# Patient Record
Sex: FEMALE | URBAN - METROPOLITAN AREA
[De-identification: names, ages, dates, MRNs, and addresses within clinical notes are randomized per-mention and may not be internally consistent; named-entity substitution may affect disease eponyms.]

---

## 2022-06-17 ENCOUNTER — TELEPHONE (OUTPATIENT)
Dept: FAMILY MEDICINE CLINIC | Facility: CLINIC | Age: 62
End: 2022-06-17

## 2022-11-11 ENCOUNTER — TELEPHONE (OUTPATIENT)
Dept: FAMILY MEDICINE CLINIC | Facility: CLINIC | Age: 62
End: 2022-11-11

## 2023-03-10 ENCOUNTER — TELEPHONE (OUTPATIENT)
Dept: FAMILY MEDICINE CLINIC | Facility: CLINIC | Age: 63
End: 2023-03-10

## 2023-03-10 NOTE — TELEPHONE ENCOUNTER
Pt is on the [facility] supervisit list under Dr. Isrrael Armendariz. Never been seen here.   Letter sent to pt

## 2023-05-15 ENCOUNTER — TELEPHONE (OUTPATIENT)
Dept: OBGYN CLINIC | Facility: CLINIC | Age: 63
End: 2023-05-15

## 2023-05-15 NOTE — TELEPHONE ENCOUNTER
Pt had Dr. Alia Christian sent over test results to Dr. Betsy Kahn.   Pt is asking if the pap results have been received, it was sent around 11am on 5/15    Pls advise

## 2023-05-16 NOTE — TELEPHONE ENCOUNTER
Incoming faxes reviewed, nothing received for pt. Confirmed fax number with patient. States she will call to have PAP/HPV results faxed over.

## 2023-05-19 ENCOUNTER — TELEPHONE (OUTPATIENT)
Dept: HEMATOLOGY/ONCOLOGY | Facility: HOSPITAL | Age: 63
End: 2023-05-19

## 2023-05-22 NOTE — TELEPHONE ENCOUNTER
Patient calling to inquire if fax has been received. Patient  mentioned last fax sent last week Wednesday. Please call patient with feedback.

## 2023-05-24 NOTE — TELEPHONE ENCOUNTER
Pt is asking that Dr. Susanne Quach office call Dr Helena Galloway office  Phone number [phone]. Pt is asking that Dr. Jacinto Hawkins call the Dr instead of pt and give Dr. Nuvia Taylor office Dr. Susanne Quach fax number and request pap smear test results. Pt stating Dr. Andersen Client has faxed several times and pt is asking if the results have been received. Pt would like a callback verifying receipt of results for the appt pt has on 6/8.

## 2023-05-24 NOTE — TELEPHONE ENCOUNTER
Pt is calling  Asking if you can request pap results she is getting no  Response from DR Dr Ottoniel Bowser office  Phone number [phone]

## 2023-05-24 NOTE — TELEPHONE ENCOUNTER
Informed pt we have not received records yet. Pt states she called Dr Emelia Ford office several times and she is not getting anywhere. Advised pt medical records can take time to process and advised to give it a few days. Pt became upset. States no one wants to help her. States she has cervical cancer and asking if she is seeing the appropriate physician for cervical cancer. Advised pt that nurse cannot say without looking at her record. States she has HPV. Again, advised pt nurse cannot say without seeing record because there are different strains of hpv and also different levels of risk. Advised that [facility] Leone [address] can see pt but if pt needs gyne/onc then she would be referred to a specialist. Pt states she thinks she made the appt with the wrong type of doctor. States her doctor, Dr Gurpreet Woodard is also OB/gyne. Pt states, just cancel my appt and disconnected the call. Attempted to call Dr Sofia Rho clinic to ask that they call pt and direct her plan of care.  Office automated recording states clinic closes at 3 pm.

## 2023-05-25 ENCOUNTER — TELEPHONE (OUTPATIENT)
Dept: HEMATOLOGY/ONCOLOGY | Facility: HOSPITAL | Age: 63
End: 2023-05-25

## 2023-05-25 NOTE — TELEPHONE ENCOUNTER
Received fax, Pap records from Dr Yeni Watson. Pap report is signed off with comment stating \"Patient aware referral to gyneoncology\". Informed pt we received Pap records with written message for pt to see gyneonc. Informed pt [facility] is ob/gyne and not gyneonc. Pt requesting to cancel her appt with [facility]. Pt would like to  record at [facility]. Provided pt with [facility] loc info. Record placed in an envelope and  for pt p/u.

## 2023-05-25 NOTE — TELEPHONE ENCOUNTER
Patient called to schedule a consult with a female oncologist.  He is going to call the patient's doctor and have medical records faxed.

## 2023-05-26 ENCOUNTER — TELEPHONE (OUTPATIENT)
Dept: HEMATOLOGY/ONCOLOGY | Facility: HOSPITAL | Age: 63
End: 2023-05-26

## 2023-05-26 ENCOUNTER — TELEPHONE (OUTPATIENT)
Dept: HEMATOLOGY/ONCOLOGY | Age: 63
End: 2023-05-26

## 2023-05-26 NOTE — TELEPHONE ENCOUNTER
Partial records received for consultation at [facility]. Attempted to call referring Dr. Dione Felton for additional information. Office closed today. Will re-attempt next week.

## 2023-06-02 ENCOUNTER — TELEPHONE (OUTPATIENT)
Dept: HEMATOLOGY/ONCOLOGY | Facility: HOSPITAL | Age: 63
End: 2023-06-02

## 2023-06-02 ENCOUNTER — TELEPHONE (OUTPATIENT)
Dept: HEMATOLOGY/ONCOLOGY | Age: 63
End: 2023-06-02

## 2023-06-02 NOTE — TELEPHONE ENCOUNTER
Spoke to patient. Patient stated records were faxed over from Dr Brenda Ferrer. I explained these are the same records we rec'd last week and was not enough information to warrant a consult. Favio Cardoza attempted to reach the office last week but they were closed and according to the patient they are only open on Thursdays from 4-8. Spoke to Tray Mercer who also stated we need an office note directly referring to oncology. Informed the patient. Patient became upset and was frustrated that we would not schedule her an appointment. States she will go elsewhere.

## (undated) NOTE — LETTER
03/10/23      Dear Amy Form and wellness is an important focus of our [place] [address] office. It is time for you to schedule your visit with your primary care provider. Please take the time to call our office to schedule this appointment at [phone] or you can schedule this thru your Mychart if you have this access. If you have any questions related to this visit please do not hesitate to contact our office.       Sincerely,     Dr. Jayden Lopez

## (undated) NOTE — LETTER
06/17/22    Dear Jagdish Jasso,    Just a friendly reminder you are due for your Medicare Advantage Wellness Visit. Unlike regular appointments for medication refills or care of an acute illness, this time is uniquely dedicated to prevention, screening and coordination of care. There is simply not enough time at routine medical follow-up visits to cover all of these important topics. At a well visit, I will discuss your day-to-day functioning, update your health history and medication list, I would screen for vision, hearing, memory problems, depression, cancer, osteoporosis and heart attack or stroke risks. This is also the time to review and update vaccines, if necessary, to prevent pneumonia or flu. I can also offer advice on healthful eating and exercise, advice on weight loss if needed, and steps you can take to prevent falls or other injuries. Lastly, and optionally, wellness visits allow time to discuss advance directives such as living will or health care power of . I want to make sure that your personal wishes for end-of-life care are documented and respected in case you develop future health problems that make you unable to express your desires for life-sustaining care. As you see, this wellness benefit, created only in the last few years by Medicare, offers a unique opportunity for a thorough review of your current health status, and a chance for me to work with you to improve your health and set goals for your health care. I strongly encourage you to schedule a visit at your soonest opportunity. Please call the office at [phone]  to schedule your Medicare Advantage Wellness Visit or call the office with any questions.     Sincerely,  Dr. Sangita Pittman

## (undated) NOTE — LETTER
11/11/22      Dear Gi Fofana,    Just a friendly reminder you are due for your Medicare Advantage Wellness Visit. Unlike regular appointments for medication refills or care of an acute illness, this time is uniquely dedicated to prevention, screening and coordination of care. There is simply not enough time at routine medical follow-up visits to cover all of these important topics. At a well visit, I will discuss your day-to-day functioning, update your health history and medication list, I would screen for vision, hearing, memory problems, depression, cancer, osteoporosis and heart attack or stroke risks. This is also the time to review and update vaccines, if necessary, to prevent pneumonia or flu. I can also offer advice on healthful eating and exercise, advice on weight loss if needed, and steps you can take to prevent falls or other injuries. Lastly, and optionally, wellness visits allow time to discuss advance directives such as living will or health care power of . I want to make sure that your personal wishes for end-of-life care are documented and respected in case you develop future health problems that make you unable to express your desires for life-sustaining care. As you see, this wellness benefit, created only in the last few years by Medicare, offers a unique opportunity for a thorough review of your current health status, and a chance for me to work with you to improve your health and set goals for your health care. I strongly encourage you to schedule a visit at your soonest opportunity. Please call the office at [phone]  to schedule your Medicare Advantage Wellness Visit or for your convenience you can schedule thru mychart. If you already have a different primary provider can you please let us know or please make sure this is changed with [employer].     Sincerely,    Dr. Venessa Cardenas